# Patient Record
Sex: FEMALE | Race: BLACK OR AFRICAN AMERICAN | NOT HISPANIC OR LATINO | URBAN - METROPOLITAN AREA
[De-identification: names, ages, dates, MRNs, and addresses within clinical notes are randomized per-mention and may not be internally consistent; named-entity substitution may affect disease eponyms.]

---

## 2021-03-27 ENCOUNTER — HOSPITAL ENCOUNTER (EMERGENCY)
Facility: HOSPITAL | Age: 31
Discharge: HOME | End: 2021-03-27
Attending: EMERGENCY MEDICINE
Payer: MEDICAID

## 2021-03-27 VITALS
SYSTOLIC BLOOD PRESSURE: 120 MMHG | RESPIRATION RATE: 16 BRPM | TEMPERATURE: 99 F | WEIGHT: 140 LBS | HEIGHT: 61 IN | HEART RATE: 78 BPM | DIASTOLIC BLOOD PRESSURE: 78 MMHG | OXYGEN SATURATION: 100 % | BODY MASS INDEX: 26.43 KG/M2

## 2021-03-27 DIAGNOSIS — L25.9 CONTACT DERMATITIS, UNSPECIFIED CONTACT DERMATITIS TYPE, UNSPECIFIED TRIGGER: Primary | ICD-10-CM

## 2021-03-27 PROCEDURE — 99284 PR EMERGENCY DEPT VISIT,LEVEL IV: ICD-10-PCS | Mod: ,,, | Performed by: EMERGENCY MEDICINE

## 2021-03-27 PROCEDURE — 25000003 PHARM REV CODE 250: Performed by: STUDENT IN AN ORGANIZED HEALTH CARE EDUCATION/TRAINING PROGRAM

## 2021-03-27 PROCEDURE — 99283 EMERGENCY DEPT VISIT LOW MDM: CPT

## 2021-03-27 PROCEDURE — 99284 EMERGENCY DEPT VISIT MOD MDM: CPT | Mod: ,,, | Performed by: EMERGENCY MEDICINE

## 2021-03-27 PROCEDURE — 86803 HEPATITIS C AB TEST: CPT | Performed by: EMERGENCY MEDICINE

## 2021-03-27 PROCEDURE — 86703 HIV-1/HIV-2 1 RESULT ANTBDY: CPT | Performed by: EMERGENCY MEDICINE

## 2021-03-27 RX ORDER — CETIRIZINE HYDROCHLORIDE 5 MG/1
10 TABLET ORAL
Status: COMPLETED | OUTPATIENT
Start: 2021-03-27 | End: 2021-03-27

## 2021-03-27 RX ORDER — PREDNISONE 50 MG/1
50 TABLET ORAL DAILY
Qty: 5 TABLET | Refills: 0 | Status: SHIPPED | OUTPATIENT
Start: 2021-03-27 | End: 2021-03-27 | Stop reason: SDUPTHER

## 2021-03-27 RX ORDER — PREDNISONE 50 MG/1
50 TABLET ORAL DAILY
Qty: 5 TABLET | Refills: 0 | Status: SHIPPED | OUTPATIENT
Start: 2021-03-27 | End: 2021-04-01

## 2021-03-27 RX ADMIN — CETIRIZINE HYDROCHLORIDE 10 MG: 5 TABLET ORAL at 12:03

## 2021-03-29 LAB
HCV AB SERPL QL IA: NEGATIVE
HIV 1+2 AB+HIV1 P24 AG SERPL QL IA: NEGATIVE

## 2022-07-04 ENCOUNTER — HOSPITAL ENCOUNTER (EMERGENCY)
Facility: HOSPITAL | Age: 32
Discharge: HOME | End: 2022-07-04
Attending: EMERGENCY MEDICINE
Payer: COMMERCIAL

## 2022-07-04 VITALS
TEMPERATURE: 98 F | HEIGHT: 62 IN | BODY MASS INDEX: 25.76 KG/M2 | OXYGEN SATURATION: 100 % | DIASTOLIC BLOOD PRESSURE: 81 MMHG | RESPIRATION RATE: 18 BRPM | SYSTOLIC BLOOD PRESSURE: 124 MMHG | WEIGHT: 140 LBS | HEART RATE: 86 BPM
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
## 2022-07-04 PROCEDURE — 99282 PR EMERGENCY DEPT VISIT,LEVEL II: ICD-10-PCS | Mod: ,,, | Performed by: EMERGENCY MEDICINE

## 2022-07-04 PROCEDURE — 99283 EMERGENCY DEPT VISIT LOW MDM: CPT

## 2022-07-04 PROCEDURE — 99282 EMERGENCY DEPT VISIT SF MDM: CPT | Mod: ,,, | Performed by: EMERGENCY MEDICINE

## 2022-07-04 RX ORDER — IBUPROFEN 600 MG/1
600 TABLET ORAL EVERY 8 HOURS PRN
Qty: 9 TABLET | Refills: 0 | Status: SHIPPED | OUTPATIENT
Start: 2022-07-04 | End: 2022-07-07

## 2022-07-04 RX ORDER — ACETAMINOPHEN 500 MG
1000 TABLET ORAL
Status: DISCONTINUED | OUTPATIENT
Start: 2022-07-04 | End: 2022-07-04 | Stop reason: HOSPADM

## 2022-07-04 RX ORDER — IBUPROFEN 600 MG/1
600 TABLET ORAL
Status: DISCONTINUED | OUTPATIENT
Start: 2022-07-04 | End: 2022-07-04 | Stop reason: HOSPADM

## 2022-07-04 RX ORDER — IBUPROFEN 600 MG/1
600 TABLET ORAL EVERY 8 HOURS PRN
Qty: 9 TABLET | Refills: 0 | Status: SHIPPED | OUTPATIENT
Start: 2022-07-04 | End: 2022-07-04 | Stop reason: SDUPTHER
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
## 2022-07-04 NOTE — Clinical Note
"Marge"Luc Kumari was seen and treated in our emergency department on 7/4/2022.  She may return to work on 07/08/2022.       If you have any questions or concerns, please don't hesitate to call.      Riri Fontaine MD"

## 2022-07-27 ENCOUNTER — HOSPITAL ENCOUNTER (EMERGENCY)
Facility: HOSPITAL | Age: 32
Discharge: HOME | End: 2022-07-27
Attending: EMERGENCY MEDICINE
Payer: COMMERCIAL

## 2022-07-27 VITALS
DIASTOLIC BLOOD PRESSURE: 85 MMHG | HEART RATE: 88 BPM | OXYGEN SATURATION: 99 % | BODY MASS INDEX: 25.76 KG/M2 | HEIGHT: 62 IN | SYSTOLIC BLOOD PRESSURE: 127 MMHG | TEMPERATURE: 98 F | WEIGHT: 140 LBS | RESPIRATION RATE: 18 BRPM

## 2022-07-27 DIAGNOSIS — B34.9 VIRAL SYNDROME: Primary | ICD-10-CM

## 2022-07-27 LAB
B-HCG UR QL: NEGATIVE
CTP QC/QA: YES
CTP QC/QA: YES
POC MOLECULAR INFLUENZA A AGN: NEGATIVE
POC MOLECULAR INFLUENZA B AGN: NEGATIVE
SARS-COV-2 RDRP RESP QL NAA+PROBE: NEGATIVE

## 2022-07-27 PROCEDURE — 25000003 PHARM REV CODE 250: Performed by: PHYSICIAN ASSISTANT

## 2022-07-27 PROCEDURE — 99283 EMERGENCY DEPT VISIT LOW MDM: CPT

## 2022-07-27 PROCEDURE — U0002 COVID-19 LAB TEST NON-CDC: HCPCS | Performed by: EMERGENCY MEDICINE

## 2022-07-27 PROCEDURE — 99284 EMERGENCY DEPT VISIT MOD MDM: CPT | Mod: CS,,, | Performed by: PHYSICIAN ASSISTANT

## 2022-07-27 PROCEDURE — 99284 PR EMERGENCY DEPT VISIT,LEVEL IV: ICD-10-PCS | Mod: CS,,, | Performed by: PHYSICIAN ASSISTANT

## 2022-07-27 PROCEDURE — 87502 INFLUENZA DNA AMP PROBE: CPT

## 2022-07-27 PROCEDURE — 81025 URINE PREGNANCY TEST: CPT | Performed by: PHYSICIAN ASSISTANT

## 2022-07-27 RX ORDER — BUTALBITAL, ACETAMINOPHEN AND CAFFEINE 50; 325; 40 MG/1; MG/1; MG/1
1 TABLET ORAL EVERY 6 HOURS PRN
Qty: 20 TABLET | Refills: 0 | Status: SHIPPED | OUTPATIENT
Start: 2022-07-27 | End: 2022-08-26

## 2022-07-27 RX ORDER — ACETAMINOPHEN 500 MG
1000 TABLET ORAL
Status: COMPLETED | OUTPATIENT
Start: 2022-07-27 | End: 2022-07-27

## 2022-07-27 RX ORDER — IBUPROFEN 600 MG/1
600 TABLET ORAL
Status: COMPLETED | OUTPATIENT
Start: 2022-07-27 | End: 2022-07-27

## 2022-07-27 RX ADMIN — IBUPROFEN 600 MG: 600 TABLET, FILM COATED ORAL at 05:07

## 2022-07-27 RX ADMIN — ACETAMINOPHEN 1000 MG: 500 TABLET ORAL at 05:07

## 2022-07-27 NOTE — DISCHARGE INSTRUCTIONS
Diagnosis:  Viral syndrome    Your symptoms are most likely due to a virus.  Your COVID-19 and flu tests were negative.  I am prescribing medicine that you can take as needed for headache.  Make sure to get plenty of rest and stay hydrated.    Please schedule follow-up appointment with your primary care doctor.  If you start to have any worsening symptoms, please return to the emergency department.

## 2022-07-27 NOTE — Clinical Note
"Margeshu Kumari (Brittney) was seen and treated in our emergency department on 7/27/2022.     COVID-19 is present in our communities across the state. There is limited testing for COVID at this time, so not all patients can be tested. In this situation, your employee meets the following criteria:    Marge Kumari has met the criteria for COVID-19 testing and has a NEGATIVE result. The employee can return to work once they are asymptomatic for 24 hours without the use of fever reducing medications (Tylenol, Motrin, etc).     If the employee is not fully vaccinated and had a close contact:  · Retest at 5 to 7 days post-exposure  · If possible, it is recommended that they quarantine for 5 days from the time of contact regardless of their test status.  · A mask should be worn post quarantine for 5 days.    If you have any questions or concerns, or if I can be of further assistance, please do not hesitate to contact me.    Sincerely,             Corrine Mora PA-C"

## 2022-07-27 NOTE — FIRST PROVIDER EVALUATION
Medical screening exam completed.  I have conducted a focused provider triage encounter, findings are as follows:    Brief history of present illness:  Headaches, subjective fevers, blurred vision (resolved)     There were no vitals filed for this visit.    Pertinent physical exam:  nonfocal neuro    Brief workup plan:  covid swab, await intake room    Preliminary workup initiated; this workup will be continued and followed by the physician or advanced practice provider that is assigned to the patient when roomed.

## 2023-03-09 ENCOUNTER — OFFICE VISIT (OUTPATIENT)
Dept: OBSTETRICS AND GYNECOLOGY | Facility: CLINIC | Age: 33
End: 2023-03-09
Payer: COMMERCIAL

## 2023-03-09 VITALS
SYSTOLIC BLOOD PRESSURE: 118 MMHG | WEIGHT: 154.31 LBS | BODY MASS INDEX: 28.39 KG/M2 | HEIGHT: 62 IN | DIASTOLIC BLOOD PRESSURE: 80 MMHG

## 2023-03-09 DIAGNOSIS — Z01.419 WELL WOMAN EXAM WITH ROUTINE GYNECOLOGICAL EXAM: Primary | ICD-10-CM

## 2023-03-09 PROCEDURE — 3079F DIAST BP 80-89 MM HG: CPT | Mod: CPTII,S$GLB,, | Performed by: OBSTETRICS & GYNECOLOGY

## 2023-03-09 PROCEDURE — 1159F PR MEDICATION LIST DOCUMENTED IN MEDICAL RECORD: ICD-10-PCS | Mod: CPTII,S$GLB,, | Performed by: OBSTETRICS & GYNECOLOGY

## 2023-03-09 PROCEDURE — 88175 CYTOPATH C/V AUTO FLUID REDO: CPT | Performed by: OBSTETRICS & GYNECOLOGY

## 2023-03-09 PROCEDURE — 1159F MED LIST DOCD IN RCRD: CPT | Mod: CPTII,S$GLB,, | Performed by: OBSTETRICS & GYNECOLOGY

## 2023-03-09 PROCEDURE — 3008F BODY MASS INDEX DOCD: CPT | Mod: CPTII,S$GLB,, | Performed by: OBSTETRICS & GYNECOLOGY

## 2023-03-09 PROCEDURE — 3074F SYST BP LT 130 MM HG: CPT | Mod: CPTII,S$GLB,, | Performed by: OBSTETRICS & GYNECOLOGY

## 2023-03-09 PROCEDURE — 87624 HPV HI-RISK TYP POOLED RSLT: CPT | Performed by: OBSTETRICS & GYNECOLOGY

## 2023-03-09 PROCEDURE — 99999 PR PBB SHADOW E&M-EST. PATIENT-LVL III: CPT | Mod: PBBFAC,,, | Performed by: OBSTETRICS & GYNECOLOGY

## 2023-03-09 PROCEDURE — 3079F PR MOST RECENT DIASTOLIC BLOOD PRESSURE 80-89 MM HG: ICD-10-PCS | Mod: CPTII,S$GLB,, | Performed by: OBSTETRICS & GYNECOLOGY

## 2023-03-09 PROCEDURE — 99385 PR PREVENTIVE VISIT,NEW,18-39: ICD-10-PCS | Mod: S$GLB,,, | Performed by: OBSTETRICS & GYNECOLOGY

## 2023-03-09 PROCEDURE — 3008F PR BODY MASS INDEX (BMI) DOCUMENTED: ICD-10-PCS | Mod: CPTII,S$GLB,, | Performed by: OBSTETRICS & GYNECOLOGY

## 2023-03-09 PROCEDURE — 99999 PR PBB SHADOW E&M-EST. PATIENT-LVL III: ICD-10-PCS | Mod: PBBFAC,,, | Performed by: OBSTETRICS & GYNECOLOGY

## 2023-03-09 PROCEDURE — 99385 PREV VISIT NEW AGE 18-39: CPT | Mod: S$GLB,,, | Performed by: OBSTETRICS & GYNECOLOGY

## 2023-03-09 PROCEDURE — 3074F PR MOST RECENT SYSTOLIC BLOOD PRESSURE < 130 MM HG: ICD-10-PCS | Mod: CPTII,S$GLB,, | Performed by: OBSTETRICS & GYNECOLOGY

## 2023-03-09 NOTE — PROGRESS NOTES
History & Physical  Gynecology      SUBJECTIVE:     Chief Complaint: Establish Care and Well Woman       History of Present Illness:  Annual Exam-Premenopausal  Patient presents for annual exam. She has no complaints today. Menstrual cycles are monthly.  She is not currently sexually active. GYN screening history: last pap: approximate date 3 years ago and was normal. She participates in regular exercise: yes.  Smoking status:  no    Contraception: none     FH:  Breast cancer: neg  Colon cancer: neg  Ovarian cancer: neg    Review of patient's allergies indicates:  No Known Allergies    History reviewed. No pertinent past medical history.  History reviewed. No pertinent surgical history.  OB History    No obstetric history on file.       Family History   Problem Relation Age of Onset    Hypertension Maternal Grandmother     Diabetes Maternal Grandmother      Social History     Tobacco Use    Smoking status: Never     Passive exposure: Never    Smokeless tobacco: Never   Substance Use Topics    Alcohol use: Not Currently    Drug use: Never       No current outpatient medications on file.     No current facility-administered medications for this visit.         Review of Systems:  Review of Systems   Constitutional:  Negative for appetite change, fever and unexpected weight change.   Respiratory:  Negative for shortness of breath.    Cardiovascular:  Negative for chest pain.   Gastrointestinal:  Negative for nausea and vomiting.   Genitourinary:  Negative for menorrhagia, menstrual problem, pelvic pain, vaginal bleeding, vaginal discharge and vaginal pain.   Integumentary:  Negative for breast mass.   Breast: Negative for lump and mass     OBJECTIVE:     Physical Exam:  Physical Exam  Vitals and nursing note reviewed.   Constitutional:       Appearance: She is well-developed.   Cardiovascular:      Rate and Rhythm: Normal rate and regular rhythm.      Heart sounds: Normal heart sounds.   Pulmonary:      Effort:  Pulmonary effort is normal.      Breath sounds: Normal breath sounds.   Chest:   Breasts:     Breasts are symmetrical.      Right: No inverted nipple, mass, nipple discharge, skin change or tenderness.      Left: No inverted nipple, mass, nipple discharge, skin change or tenderness.   Abdominal:      Palpations: Abdomen is soft.   Genitourinary:     General: Normal vulva.      Labia:         Right: No rash, tenderness, lesion or injury.         Left: No rash, tenderness, lesion or injury.       Urethra: No prolapse, urethral pain, urethral swelling or urethral lesion.      Vagina: Normal. No signs of injury and foreign body. No vaginal discharge, erythema, tenderness or bleeding.      Cervix: No cervical motion tenderness, discharge or friability.      Uterus: Not deviated, not enlarged, not fixed and not tender.       Adnexa:         Right: No mass, tenderness or fullness.          Left: No mass, tenderness or fullness.        Rectum: No anal fissure or external hemorrhoid.      Comments: Urethral meatus: normal size, anterior vaginal wall with no prolapse, no lesions  Bladder: no fullness, masses or tenderness  Musculoskeletal:      Cervical back: Normal range of motion.   Neurological:      Mental Status: She is alert and oriented to person, place, and time.   Psychiatric:         Behavior: Behavior normal.         Thought Content: Thought content normal.         Judgment: Judgment normal.       Chaperoned by: Toni    ASSESSMENT:       ICD-10-CM ICD-9-CM    1. Well woman exam with routine gynecological exam  Z01.419 V72.31              Plan:      Marge was seen today for establish care and well woman.    Diagnoses and all orders for this visit:    Well woman exam with routine gynecological exam        No orders of the defined types were placed in this encounter.      Well Woman:  - Pap smear and hpv today  - Birth control: none needed forn ow  - GC/CT:n/a  - Mammogram: due age 41  - Smoking cessation:  n/a  - Labs: none required   - Vaccines: HPV part 3 today  - Exercise counseling      Follow up in  one year for annual or prn.    Soledad Sanders

## 2023-03-13 LAB
HPV HR 12 DNA SPEC QL NAA+PROBE: NEGATIVE
HPV16 AG SPEC QL: NEGATIVE
HPV18 DNA SPEC QL NAA+PROBE: NEGATIVE

## 2023-03-14 LAB
FINAL PATHOLOGIC DIAGNOSIS: NORMAL
Lab: NORMAL

## 2023-12-12 ENCOUNTER — PATIENT MESSAGE (OUTPATIENT)
Dept: RESEARCH | Facility: HOSPITAL | Age: 33
End: 2023-12-12
Payer: COMMERCIAL

## 2025-03-06 ENCOUNTER — OFFICE VISIT (OUTPATIENT)
Dept: URGENT CARE | Facility: CLINIC | Age: 35
End: 2025-03-06
Payer: COMMERCIAL

## 2025-03-06 VITALS
HEART RATE: 78 BPM | TEMPERATURE: 99 F | OXYGEN SATURATION: 97 % | DIASTOLIC BLOOD PRESSURE: 78 MMHG | HEIGHT: 62 IN | BODY MASS INDEX: 28.34 KG/M2 | SYSTOLIC BLOOD PRESSURE: 134 MMHG | RESPIRATION RATE: 17 BRPM | WEIGHT: 154 LBS

## 2025-03-06 DIAGNOSIS — R05.8 OTHER COUGH: ICD-10-CM

## 2025-03-06 DIAGNOSIS — J02.9 SORE THROAT: ICD-10-CM

## 2025-03-06 DIAGNOSIS — J02.9 VIRAL PHARYNGITIS: Primary | ICD-10-CM

## 2025-03-06 DIAGNOSIS — J34.89 NASAL CONGESTION WITH RHINORRHEA: ICD-10-CM

## 2025-03-06 DIAGNOSIS — R09.81 NASAL CONGESTION WITH RHINORRHEA: ICD-10-CM

## 2025-03-06 DIAGNOSIS — H61.22 IMPACTED CERUMEN OF LEFT EAR: ICD-10-CM

## 2025-03-06 LAB
CTP QC/QA: YES
CTP QC/QA: YES
HETEROPH AB SER QL: NEGATIVE
MOLECULAR STREP A: NEGATIVE

## 2025-03-06 RX ORDER — PREDNISONE 20 MG/1
20 TABLET ORAL 2 TIMES DAILY
Qty: 10 TABLET | Refills: 0 | Status: SHIPPED | OUTPATIENT
Start: 2025-03-06 | End: 2025-03-11

## 2025-03-06 RX ORDER — BROMPHENIRAMINE MALEATE, PSEUDOEPHEDRINE HYDROCHLORIDE, AND DEXTROMETHORPHAN HYDROBROMIDE 2; 30; 10 MG/5ML; MG/5ML; MG/5ML
10 SYRUP ORAL EVERY 4 HOURS PRN
Qty: 150 ML | Refills: 0 | Status: SHIPPED | OUTPATIENT
Start: 2025-03-06 | End: 2025-03-16

## 2025-03-06 RX ORDER — FLUTICASONE PROPIONATE 50 MCG
2 SPRAY, SUSPENSION (ML) NASAL DAILY PRN
Qty: 16 G | Refills: 0 | Status: SHIPPED | OUTPATIENT
Start: 2025-03-06 | End: 2025-04-05

## 2025-03-06 RX ORDER — LIDOCAINE HYDROCHLORIDE 20 MG/ML
10 SOLUTION OROPHARYNGEAL
Status: COMPLETED | OUTPATIENT
Start: 2025-03-06 | End: 2025-03-06

## 2025-03-06 RX ORDER — LIDOCAINE HYDROCHLORIDE 20 MG/ML
SOLUTION OROPHARYNGEAL
Qty: 100 ML | Refills: 0 | Status: SHIPPED | OUTPATIENT
Start: 2025-03-06

## 2025-03-06 RX ADMIN — LIDOCAINE HYDROCHLORIDE 10 ML: 20 SOLUTION OROPHARYNGEAL at 11:03

## 2025-03-06 NOTE — LETTER
"  March 6, 2025      [facility] [address] [place] [address]  Phone: [phone]  Fax: [phone]       Patient: Marge Kumari   YOB: 1990  Date of Visit: 03/06/2025    To Whom It May Concern:    Karlo Kumari  was at [facility] on 03/06/2025. The patient may return to work/school on 3/7/25 with no restrictions. If you have any questions or concerns, or if I can be of further assistance, please do not hesitate to contact me.    Sincerely,          Elsalatrice Gutierrez PA-C (Jackie)       "

## 2025-03-06 NOTE — PROGRESS NOTES
"Subjective:      Patient ID: Marge Kumari is a 35 y.o. female.    Vitals:  height is 5' 2" (1.575 m) and weight is 69.9 kg (154 lb). Her oral temperature is 98.9 °F (37.2 °C). Her blood pressure is 134/78 and her pulse is 78. Her respiration is 17 and oxygen saturation is 97%.     Chief Complaint: Sinus Problem    Marge Kumari is a 35 y.o. female who complains of sore throat, congestion and dry cough, sx started 6 days ago (3/1/25) and patient is taking otc medications (theraflu and cough drops).  Pt states sore throat pain is 10/10.     Sinus Problem  This is a new problem. The current episode started 1 to 4 weeks ago. The problem is unchanged. There has been no fever. Associated symptoms include congestion, coughing, headaches, a hoarse voice, sinus pressure, sneezing, a sore throat and swollen glands. Pertinent negatives include no chills, diaphoresis, ear pain, neck pain or shortness of breath. Treatments tried: theraflu and cough drops. The treatment provided mild relief.       Constitution: Negative for chills, sweating, fatigue, fever and generalized weakness.   HENT:  Positive for congestion, postnasal drip, sinus pain, sinus pressure, sore throat and trouble swallowing. Negative for ear pain, ear discharge, foreign body in ear, tinnitus and voice change.    Neck: Negative for neck pain.   Cardiovascular:  Negative for chest pain, leg swelling, palpitations and sob on exertion.   Respiratory:  Positive for cough. Negative for sputum production, shortness of breath, wheezing and asthma.    Gastrointestinal:  Negative for nausea and vomiting.   Musculoskeletal:  Negative for muscle cramps and muscle ache.   Allergic/Immunologic: Positive for sneezing. Negative for environmental allergies, seasonal allergies, food allergies and asthma.   Neurological:  Positive for headaches.      Objective:     Physical Exam   Constitutional: She is oriented to person, place, and time. She is cooperative. No distress.    "   Comments:Patient is awake and alert, sitting up in exam chair, speaking and answering in complete sentences     normalawake  HENT:   Head: Normocephalic and atraumatic.      Comments: Patient observed breathing through mouth, sniffling, and frequently clearing throat.    Ears:   Right Ear: Tympanic membrane, external ear and ear canal normal.   Left Ear: External ear normal. impacted cerumen  Nose: Rhinorrhea and congestion present.   Mouth/Throat: Uvula is midline and mucous membranes are normal. Mucous membranes are moist. Posterior oropharyngeal erythema present. No oropharyngeal exudate. Tonsils are 2+ on the right. Tonsils are 2+ on the left. Tonsillar exudate. Oropharynx is clear.      Comments:  postnasal discharge noted on the posterior pharyngeal wall    Eyes: Conjunctivae and lids are normal. Pupils are equal, round, and reactive to light. Extraocular movement intact vision grossly intact gaze aligned appropriately   Neck: Neck supple.   Cardiovascular: Normal rate, regular rhythm, normal heart sounds and normal pulses.   Pulmonary/Chest: Effort normal and breath sounds normal. No respiratory distress. She has no wheezes. She has no rhonchi. She has no rales.   Abdominal: Normal appearance.   Musculoskeletal: Normal range of motion.         General: Normal range of motion.      Cervical back: She exhibits tenderness.   Lymphadenopathy:     She has cervical adenopathy.   Neurological: She is alert and oriented to person, place, and time.   Skin: Skin is warm.   Psychiatric: Her behavior is normal. Mood, judgment and thought content normal.   Nursing note and vitals reviewed.      Assessment:     1. Viral pharyngitis    2. Other cough    3. Nasal congestion with rhinorrhea    4. Sore throat    5. Impacted cerumen of left ear      Patient presents with clinical exam findings and history consistent with above.      On exam, patient is nontoxic appearing and vitals are stable.        Diagnostic testing  results were reviewed and discussed with patient/guardian.   Tests ordered in clinic:  Results for orders placed or performed in visit on 03/06/25   POCT Strep A, Molecular    Collection Time: 03/06/25 11:35 AM   Result Value Ref Range    Molecular Strep A, POC Negative Negative     Acceptable Yes    POCT Infectious mononucleosis antibody    Collection Time: 03/06/25 11:50 AM   Result Value Ref Range    Monospot Negative Negative     Acceptable Yes        Previous progress notes/admissions/labs and medications were reviewed.        Plan:   Patient reports sore throat pain has resolved with oral lidocaine.     Viral pharyngitis    Other cough  -     brompheniramine-pseudoeph-DM (BROMFED DM) 2-30-10 mg/5 mL Syrp; Take 10 mLs by mouth every 4 (four) hours as needed (cough, cold, and congestion).  Dispense: 150 mL; Refill: 0    Nasal congestion with rhinorrhea  -     brompheniramine-pseudoeph-DM (BROMFED DM) 2-30-10 mg/5 mL Syrp; Take 10 mLs by mouth every 4 (four) hours as needed (cough, cold, and congestion).  Dispense: 150 mL; Refill: 0  -     fluticasone propionate (FLONASE) 50 mcg/actuation nasal spray; 2 sprays (100 mcg total) by Each Nostril route daily as needed for Allergies or Rhinitis.  Dispense: 16 g; Refill: 0    Sore throat  -     POCT Strep A, Molecular  -     LIDOcaine viscous HCl 2% (LIDOCAINE VISCOUS) 2 % Soln; Swish for 15 seconds with 10 mL (one capful) then swallow every 4 hours prn painful sore throat.  Dispense: 100 mL; Refill: 0  -     LIDOcaine viscous HCl 2% oral solution 10 mL  -     POCT Infectious mononucleosis antibody  -     predniSONE (DELTASONE) 20 MG tablet; Take 1 tablet (20 mg total) by mouth 2 (two) times daily. for 5 days  Dispense: 10 tablet; Refill: 0    Impacted cerumen of left ear  -     Ear wax removal  -     Ear Cerumen Removal      Ear Cerumen Removal    Date/Time: 3/6/2025 10:45 AM    Performed by: Elsa Gutierrez PA-C  Authorized by: Brenda  "YULISA Hunter    Ceruminolytics applied: Ceruminolytics applied prior to the procedure    Medication Used:  Other  Location details:  Left ear  Procedure type: irrigation    Cerumen  Removal Results:  Cerumen completely removed  Patient tolerance:  Patient tolerated the procedure well with no immediate complications     Discussed complications can occur from cerumen removal such short-term hearing loss (Hearing should return after blockage is removed), infection in the outer, tinnitus,Infection in the outer ear (bleeding, swelling, or drainage), and rupture of tympanic membrane (ear drum).                  1) See orders for this visit as documented in the electronic medical record.  2) Symptomatic therapy suggested: use acetaminophen/ibuprofen every 6-8 hours prn pain or fever, push fluids.   3) Call or return to clinic prn if these symptoms worsen or fail to improve as anticipated.    Discussed results/diagnosis/plan with patient in clinic.  We had shared decision making for patient's treatment. Patient verbalized understanding and in agreement with current treatment plan.     Patient was instructed to return for re-evaluation with urgent care or PCP for continued outpatient workup and management if symptoms do not improve/worsening symptoms. Strict ED versus clinic precautions given in depth.    Discharge and follow-up instructions given verbally/printed with the patient who expressed understanding. The instructions and results are also available on [employer].              Elsa "Sedrick Gutierrez PA-C          Patient Instructions   Mononucleosis, or mono aka kissing disease, is an infection that causes flu-like signs. These include fever, feeling tired, and swollen neck glands. It is caused by a germ called the Grisel-Barr virus.The infection may go away on its own. You will start to feel better in 1 to 2 weeks. Full recovery may happen after 2 to 3 months. Discussed risks of spleen rupture with contact sports.Patient " counseled on antibiotics such as amoxicillin in patients with EB virus infectious mononucleosis can often lead to generalized maculopapular rash.       Recommend oral antihistamine (loratadine [Claritin]/cetrizine [Zyrtec]) +/- oral decongestant (pseudoephedrine) for rhinorrhea, steroid nasal spray (flonase), prescription/OTC cough medicine [brompheniramine-pseudoeph-DM (BROMFED DM) ] as needed during day before 8 pm,  Nyquil at night, Tylenol (Acetaminophen) and/or Motrin (Ibuprofen) as directed for control of pain and/or fever.   Bromphed contains pseudoephedrine so please do not take a separate oral decongestant (sudafed/pseudoephedrine) or stimulant (adderall/vyvanse) for ADHD.       Please drink plenty of fluids.  Please get plenty of rest.  Nasal irrigation with a saline spray or Netti Pot like device per their directions is also recommended.  If you  smoke, please stop smoking.    To help ease a sore throat, you can:  Use a sore throat spray.  Suck on hard candy or throat lozenges.  Gargle with warm saltwater a few times each day. Mix of 1/4 teaspoon (1.25 grams) salt in 8 ounces (240 mL) of warm water.  Use a cool mist humidifier to help you breathe easier.    If you negative (-) for a COVID test today and you are continuing to have symptoms, it is recommended to repeat the test in 48 hours x 3. If you continue to be negative, you may return to school/work once you have improved symptoms and no fever for 24 hours without any medications. This applies to all viral illnesses.       Discussed prescriptions and over-the-counter medicines to help with patient's symptoms:  A steroid nose spray (flonase) and antihistamine nasal spray (azelastine) can help with a stuffy nose. It can also help with drainage down the back of your throat.  An antihistamine (loratadine,zyrtec,allegra, xyzal) can help with itching, sneezing, or runny nose.  An antihistamine eye drop can help with itchy eyes.  A decongestant  (pseudoephedrine,  Phenylephrine, oxymetazoline aka afrin nasal spray) can help with a stuffy nose. Take <10 days for congestion and rhinorrhea. Once symptoms improve, proceed with loratadine/zyrtec once a day. These ingredients can keep you up all night, decrease appetite, feel jittery, and raise blood pressure with long term use.  Medications that control cough are suppressants and expectorants. Suppressants are tessalon pearls and dextromethorphan. If you have a productive cough with sputum, you need an expectorant called guaifenesin. Dextromethorphan and Guaifenesin are active ingredients in many OTC cough/cold medications such as Dayquil/Nyquil, Mucinex, and Robitussin Mucus+Chest Congestion.            Common Cold Medicine Ingredients Cheat sheet  Acetaminophen (APAP) -pain reliever/fever reducer  Dextromethorphan - cough suppressant  Guaifenesin - expectorant/thins and loosens mucus  Phenylephrine - nasal decongestant  Diphenhydramine or Doxylamine succinate - antihistamine, helps you fall asleep  Promethazine or Brompheniramine - Prescription strength antihistamines    These OTC cold medications are safe to use if you do not have high blood pressure (hypertension) or palpitations.  DayQuil and NyQuil - Cough, Cold & Flu Relief LiquiCaps  DayQuil: Acetaminophen, Dextromethorphan, and Phenylephrine   NyQuil: Acetaminophen, Dextromethorphan, and Doxylamine  DayQuil and NyQuil SEVERE Maximum Strength Cough, Cold & Flu Relief LiquiCaps   DAYQUIL: Acetaminophen, Dextromethorphan, Guaifenesin, and Phenylephrine  NYQUIL: Acetaminophen, Dextromethorphan, Doxylamine, and Phenylephrine   Mucinex DM: Guaifenesin,Dextromethorphan  Mucinex Maximum Strength Sinus-Max® Pressure, Pain & Cough Liquid Gels: Acetaminophen/Dextromethorphan/ Guaifenesin/Phenylephrine       If not allergic, take Tylenol (Acetaminophen) 650 mg to  1 g every 6 hours as needed  and/or Motrin (Ibuprofen) 600 to 800 mg every 6 hours as needed for fever  or pain.          Please remember that you have received care at an urgent care today. Urgent cares are not emergency rooms and are not equipped to handle life threatening emergencies and cannot rule in or out certain medical conditions and you may be released before all of your medical problems are known or treated.     Please arrange follow up with your primary care physician or speciality clinic within 2-5 days if your signs and symptoms have not resolved or worsen.     Patient can call our Referral Hotline at [phone] to make an appointment.      Please return here or go to the Emergency Department for any concerns or worsening of condition.  Signs of infection. These include a fever of 100.4°F (38°C) or higher, chills, cough, more sputum or change in color of sputum.  You are having so much trouble breathing that you can only say one or two words at a time.  You need to sit upright at all times to be able to breathe and or cannot lie down.  You have trouble breathing when talking or sitting still.  You have a fever of 100.4°F (38°C) or higher or chills.  You have chest pain when you cough, have trouble breathing but can still talk in full sentences, or cough up blood.

## 2025-03-06 NOTE — PROCEDURES
Ear Cerumen Removal    Date/Time: 3/6/2025 10:45 AM    Performed by: Elsa Gutierrez PA-C  Authorized by: Elsa Gutierrez PA-C    Ceruminolytics applied: Ceruminolytics applied prior to the procedure    Medication Used:  Other  Location details:  Left ear  Procedure type: irrigation    Cerumen  Removal Results:  Cerumen partially removed  Patient tolerance:  Patient tolerated the procedure well with no immediate complications     Discussed complications can occur from cerumen removal such short-term hearing loss (Hearing should return after blockage is removed), infection in the outer, tinnitus,Infection in the outer ear (bleeding, swelling, or drainage), and rupture of tympanic membrane (ear drum).

## 2025-03-06 NOTE — PATIENT INSTRUCTIONS
Mononucleosis, or mono aka kissing disease, is an infection that causes flu-like signs. These include fever, feeling tired, and swollen neck glands. It is caused by a germ called the Grisel-Barr virus.The infection may go away on its own. You will start to feel better in 1 to 2 weeks. Full recovery may happen after 2 to 3 months. Discussed risks of spleen rupture with contact sports.Patient counseled on antibiotics such as amoxicillin in patients with EB virus infectious mononucleosis can often lead to generalized maculopapular rash.       Recommend oral antihistamine (loratadine [Claritin]/cetrizine [Zyrtec]) +/- oral decongestant (pseudoephedrine) for rhinorrhea, steroid nasal spray (flonase), prescription/OTC cough medicine [brompheniramine-pseudoeph-DM (BROMFED DM) ] as needed during day before 8 pm,  Nyquil at night, Tylenol (Acetaminophen) and/or Motrin (Ibuprofen) as directed for control of pain and/or fever.   Bromphed contains pseudoephedrine so please do not take a separate oral decongestant (sudafed/pseudoephedrine) or stimulant (adderall/vyvanse) for ADHD.       Please drink plenty of fluids.  Please get plenty of rest.  Nasal irrigation with a saline spray or Netti Pot like device per their directions is also recommended.  If you  smoke, please stop smoking.    To help ease a sore throat, you can:  Use a sore throat spray.  Suck on hard candy or throat lozenges.  Gargle with warm saltwater a few times each day. Mix of 1/4 teaspoon (1.25 grams) salt in 8 ounces (240 mL) of warm water.  Use a cool mist humidifier to help you breathe easier.    If you negative (-) for a COVID test today and you are continuing to have symptoms, it is recommended to repeat the test in 48 hours x 3. If you continue to be negative, you may return to school/work once you have improved symptoms and no fever for 24 hours without any medications. This applies to all viral illnesses.       Discussed prescriptions and  over-the-counter medicines to help with patient's symptoms:  A steroid nose spray (flonase) and antihistamine nasal spray (azelastine) can help with a stuffy nose. It can also help with drainage down the back of your throat.  An antihistamine (loratadine,zyrtec,allegra, xyzal) can help with itching, sneezing, or runny nose.  An antihistamine eye drop can help with itchy eyes.  A decongestant (pseudoephedrine,  Phenylephrine, oxymetazoline aka afrin nasal spray) can help with a stuffy nose. Take <10 days for congestion and rhinorrhea. Once symptoms improve, proceed with loratadine/zyrtec once a day. These ingredients can keep you up all night, decrease appetite, feel jittery, and raise blood pressure with long term use.  Medications that control cough are suppressants and expectorants. Suppressants are tessalon pearls and dextromethorphan. If you have a productive cough with sputum, you need an expectorant called guaifenesin. Dextromethorphan and Guaifenesin are active ingredients in many OTC cough/cold medications such as Dayquil/Nyquil, Mucinex, and Robitussin Mucus+Chest Congestion.            Common Cold Medicine Ingredients Cheat sheet  Acetaminophen (APAP) -pain reliever/fever reducer  Dextromethorphan - cough suppressant  Guaifenesin - expectorant/thins and loosens mucus  Phenylephrine - nasal decongestant  Diphenhydramine or Doxylamine succinate - antihistamine, helps you fall asleep  Promethazine or Brompheniramine - Prescription strength antihistamines    These OTC cold medications are safe to use if you do not have high blood pressure (hypertension) or palpitations.  DayQuil and NyQuil - Cough, Cold & Flu Relief LiquiCaps  DayQuil: Acetaminophen, Dextromethorphan, and Phenylephrine   NyQuil: Acetaminophen, Dextromethorphan, and Doxylamine  DayQuil and NyQuil SEVERE Maximum Strength Cough, Cold & Flu Relief LiquiCaps   DAYQUIL: Acetaminophen, Dextromethorphan, Guaifenesin, and Phenylephrine  NYQUIL:  Acetaminophen, Dextromethorphan, Doxylamine, and Phenylephrine   Mucinex DM: Guaifenesin,Dextromethorphan  Mucinex Maximum Strength Sinus-Max® Pressure, Pain & Cough Liquid Gels: Acetaminophen/Dextromethorphan/ Guaifenesin/Phenylephrine       If not allergic, take Tylenol (Acetaminophen) 650 mg to  1 g every 6 hours as needed  and/or Motrin (Ibuprofen) 600 to 800 mg every 6 hours as needed for fever or pain.          Please remember that you have received care at an urgent care today. Urgent cares are not emergency rooms and are not equipped to handle life threatening emergencies and cannot rule in or out certain medical conditions and you may be released before all of your medical problems are known or treated.     Please arrange follow up with your primary care physician or speciality clinic within 2-5 days if your signs and symptoms have not resolved or worsen.     Patient can call our Referral Hotline at [phone] to make an appointment.      Please return here or go to the Emergency Department for any concerns or worsening of condition.  Signs of infection. These include a fever of 100.4°F (38°C) or higher, chills, cough, more sputum or change in color of sputum.  You are having so much trouble breathing that you can only say one or two words at a time.  You need to sit upright at all times to be able to breathe and or cannot lie down.  You have trouble breathing when talking or sitting still.  You have a fever of 100.4°F (38°C) or higher or chills.  You have chest pain when you cough, have trouble breathing but can still talk in full sentences, or cough up blood.

## 2025-06-11 ENCOUNTER — OFFICE VISIT (OUTPATIENT)
Dept: PRIMARY CARE CLINIC | Facility: CLINIC | Age: 35
End: 2025-06-11
Payer: COMMERCIAL

## 2025-06-11 VITALS
BODY MASS INDEX: 34.99 KG/M2 | RESPIRATION RATE: 16 BRPM | HEIGHT: 62 IN | WEIGHT: 190.13 LBS | OXYGEN SATURATION: 98 % | DIASTOLIC BLOOD PRESSURE: 72 MMHG | HEART RATE: 95 BPM | SYSTOLIC BLOOD PRESSURE: 125 MMHG

## 2025-06-11 DIAGNOSIS — Z00.00 WELL ADULT EXAM: Primary | ICD-10-CM

## 2025-06-11 DIAGNOSIS — E78.5 HYPERLIPIDEMIA, UNSPECIFIED HYPERLIPIDEMIA TYPE: ICD-10-CM

## 2025-06-11 PROCEDURE — 3008F BODY MASS INDEX DOCD: CPT | Mod: CPTII,S$GLB,, | Performed by: FAMILY MEDICINE

## 2025-06-11 PROCEDURE — 1160F RVW MEDS BY RX/DR IN RCRD: CPT | Mod: CPTII,S$GLB,, | Performed by: FAMILY MEDICINE

## 2025-06-11 PROCEDURE — 3078F DIAST BP <80 MM HG: CPT | Mod: CPTII,S$GLB,, | Performed by: FAMILY MEDICINE

## 2025-06-11 PROCEDURE — 1159F MED LIST DOCD IN RCRD: CPT | Mod: CPTII,S$GLB,, | Performed by: FAMILY MEDICINE

## 2025-06-11 PROCEDURE — 99385 PREV VISIT NEW AGE 18-39: CPT | Mod: S$GLB,,, | Performed by: FAMILY MEDICINE

## 2025-06-11 PROCEDURE — 99999 PR PBB SHADOW E&M-EST. PATIENT-LVL III: CPT | Mod: PBBFAC,,, | Performed by: FAMILY MEDICINE

## 2025-06-11 PROCEDURE — 3074F SYST BP LT 130 MM HG: CPT | Mod: CPTII,S$GLB,, | Performed by: FAMILY MEDICINE

## 2025-06-11 NOTE — PROGRESS NOTES
Subjective:       Patient ID: Marge Kumari is a 35 y.o. female.    Chief Complaint: Establish Care    History of Present Illness    CHIEF COMPLAINT:  Patient presents today for annual check up to evaluate weight and preventive health maintenance.    WEIGHT MANAGEMENT:  She reports significant weight gain over the past year from 140-145 lbs to 180-190 lbs. She attributes this to becoming sedentary while working from home. Previously, she regularly engaged in Pilates and high-intensity workouts but has discontinued these activities. 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    MEDICAL HISTORY:  She was diagnosed with high cholesterol 3 years ago and was recommended medication at that time. She has not been following recommended dietary modifications for cholesterol management.    FAMILY HISTORY:  Father and paternal grandmother have diabetes. Paternal grandmother also has hypertension and had an amputated leg. Sister had childhood epilepsy. Brother was born with a single kidney.    SOCIAL HISTORY:  She is single with no children. She has never smoked. She consumes 4-6 glasses of wine weekly for the past 6 months.      ROS:  General: +alcohol abuse, +increased substance use          Review of patient's allergies indicates:  No Known Allergies    Current Medications[1]    History reviewed. No pertinent past medical history. History reviewed. No pertinent surgical history.   Social History     Socioeconomic History    Marital status: Single   Tobacco Use    Smoking status: Never     Passive exposure: Never    Smokeless tobacco: Never   Substance and Sexual Activity    Alcohol use: Yes     Alcohol/week: 6.0 standard drinks of alcohol     Types: 6 Glasses of wine per week    Drug use: Never    Sexual activity: Not Currently     Social Drivers of Health     Financial Resource Strain: Low Risk  (6/8/2025)    Overall Financial Resource Strain (CARDIA)      Difficulty of Paying Living Expenses: Not hard at all   Food Insecurity: Food Insecurity Present (6/8/2025)    Hunger Vital Sign     Worried About Running Out of Food in the Last Year: Never true     Ran Out of Food in the Last Year: Sometimes true   Transportation Needs: No Transportation Needs (6/8/2025)    PRAPARE - Transportation     Lack of Transportation (Medical): No     Lack of Transportation (Non-Medical): No   Physical Activity: Insufficiently Active (6/8/2025)    Exercise Vital Sign     Days of Exercise per Week: 2 days     Minutes of Exercise per Session: 40 min   Stress: Stress Concern Present (6/8/2025)    [employer] - Occupational Stress Questionnaire     Feeling of Stress : To some extent   Housing Stability: High Risk (6/8/2025)    Housing Stability Vital Sign     Unable to Pay for Housing in the Last Year: Yes     Number of Times Moved in the Last Year: 0     Homeless in the Last Year: No      Family History   Problem Relation Name Age of Onset    Diabetes Father      Seizures Sister      Other (solitary kidney) Brother      Hypertension Maternal Grandmother      Diabetes Maternal Grandmother       Review of Systems    Objective:      Physical Exam    General: No acute distress. Well-developed. Well-nourished.  Eyes: EOMI. Sclerae anicteric.  HENT: Normocephalic. Atraumatic. Nares patent. Moist oral mucosa.  Cardiovascular: Regular rate. Regular rhythm. No murmurs. No rubs. No gallops. Normal S1, S2.  Respiratory: Normal respiratory effort. Clear to auscultation bilaterally. No rales. No rhonchi. No wheezing.  Musculoskeletal: No  obvious deformity.  Extremities: No lower extremity edema.  Neurological: Alert & oriented x3. No slurred speech. Normal gait.  Psychiatric: Normal mood. Normal affect. Good insight. Good judgment.  Skin: Warm. Dry. No rash.          Assessment:       1. Well adult exam    2. Hyperlipidemia, unspecified hyperlipidemia type        Plan:          Marge was seen today for establish care.    Diagnoses and all orders for this visit:    Well adult exam  -     Encouraged diet and exercise   -     Hemoglobin A1C; Future  -     Lipid Panel; Future  -     Comprehensive Metabolic Panel; Future    Hyperlipidemia, unspecified hyperlipidemia type  Lw fat, low cholesterol diet      Assessment & Plan    PATIENT EDUCATION:  - Educated on dietary modifications for managing elevated cholesterol, including reducing intake of fried foods, fatty foods, and full-fat dairy products.  - Discussed importance of exercise, especially after age 30, for maintaining health and managing weight.  - Explained potential risks of excessive alcohol consumption, including liver damage and cardiovascular issues.    ACTION ITEMS/LIFESTYLE:  - Recommend switching to low-fat versions of cheese, eggs, and milk products.  - Patient to resume regular exercise routine, such as walking in the neighborhood or mall.  - Patient to monitor alcohol consumption, particularly wine intake.    ORDERS:  - Ordered fasting labs to check cholesterol levels.  - Ordered liver function tests.    FOLLOW UP:  - Follow up after completing fasting labs for cholesterol assessment.          Sarita Lane MD        This note was generated with the assistance of ambient listening technology. Verbal consent was obtained by the patient and accompanying visitor(s) for the recording of patient appointment to facilitate this note. I attest to having reviewed and edited the generated note for accuracy, though some syntax or spelling errors may persist. Please contact the author of this note for any clarification.            [1]   Current Outpatient Medications:     LIDOcaine viscous HCl 2% (LIDOCAINE VISCOUS) 2 % Soln, Swish for 15 seconds with 10 mL (one capful) then swallow every 4 hours prn painful sore throat., Disp: 100 mL, Rfl: 0

## 2025-06-18 ENCOUNTER — LAB VISIT (OUTPATIENT)
Dept: PRIMARY CARE CLINIC | Facility: CLINIC | Age: 35
End: 2025-06-18
Payer: COMMERCIAL

## 2025-06-18 DIAGNOSIS — Z00.00 WELL ADULT EXAM: ICD-10-CM

## 2025-06-18 LAB
ALBUMIN SERPL BCP-MCNC: 4.3 G/DL (ref 3.5–5.2)
ALP SERPL-CCNC: 60 UNIT/L (ref 40–150)
ALT SERPL W/O P-5'-P-CCNC: 19 UNIT/L (ref 10–44)
ANION GAP (OHS): 11 MMOL/L (ref 8–16)
AST SERPL-CCNC: 18 UNIT/L (ref 11–45)
BILIRUB SERPL-MCNC: 0.4 MG/DL (ref 0.1–1)
BUN SERPL-MCNC: 12 MG/DL (ref 6–20)
CALCIUM SERPL-MCNC: 9.8 MG/DL (ref 8.7–10.5)
CHLORIDE SERPL-SCNC: 105 MMOL/L (ref 95–110)
CHOLEST SERPL-MCNC: 197 MG/DL (ref 120–199)
CHOLEST/HDLC SERPL: 3.5 {RATIO} (ref 2–5)
CO2 SERPL-SCNC: 22 MMOL/L (ref 23–29)
CREAT SERPL-MCNC: 0.8 MG/DL (ref 0.5–1.4)
EAG (OHS): 117 MG/DL (ref 68–131)
GFR SERPLBLD CREATININE-BSD FMLA CKD-EPI: >60 ML/MIN/1.73/M2
GLUCOSE SERPL-MCNC: 94 MG/DL (ref 70–110)
HBA1C MFR BLD: 5.7 % (ref 4–5.6)
HDLC SERPL-MCNC: 57 MG/DL (ref 40–75)
HDLC SERPL: 28.9 % (ref 20–50)
LDLC SERPL CALC-MCNC: 124.8 MG/DL (ref 63–159)
NONHDLC SERPL-MCNC: 140 MG/DL
POTASSIUM SERPL-SCNC: 3.8 MMOL/L (ref 3.5–5.1)
PROT SERPL-MCNC: 7.6 GM/DL (ref 6–8.4)
SODIUM SERPL-SCNC: 138 MMOL/L (ref 136–145)
TRIGL SERPL-MCNC: 76 MG/DL (ref 30–150)

## 2025-06-18 PROCEDURE — 80061 LIPID PANEL: CPT

## 2025-06-18 PROCEDURE — 83036 HEMOGLOBIN GLYCOSYLATED A1C: CPT

## 2025-06-18 PROCEDURE — 80053 COMPREHEN METABOLIC PANEL: CPT

## 2025-06-20 ENCOUNTER — RESULTS FOLLOW-UP (OUTPATIENT)
Dept: PRIMARY CARE CLINIC | Facility: CLINIC | Age: 35
End: 2025-06-20